# Patient Record
Sex: MALE | URBAN - METROPOLITAN AREA
[De-identification: names, ages, dates, MRNs, and addresses within clinical notes are randomized per-mention and may not be internally consistent; named-entity substitution may affect disease eponyms.]

---

## 2020-06-09 ENCOUNTER — APPOINTMENT (RX ONLY)
Dept: URBAN - METROPOLITAN AREA CLINIC 28 | Facility: CLINIC | Age: 30
Setting detail: DERMATOLOGY
End: 2020-06-09

## 2020-06-09 DIAGNOSIS — D22 MELANOCYTIC NEVI: ICD-10-CM

## 2020-06-09 PROBLEM — D22.5 MELANOCYTIC NEVI OF TRUNK: Status: ACTIVE | Noted: 2020-06-09

## 2020-06-09 PROCEDURE — ? COUNSELING

## 2020-06-09 PROCEDURE — ? PUNCH EXCISION

## 2020-06-09 PROCEDURE — 11400 EXC TR-EXT B9+MARG 0.5 CM<: CPT

## 2020-06-09 ASSESSMENT — LOCATION DETAILED DESCRIPTION DERM: LOCATION DETAILED: RIGHT LATERAL ABDOMEN

## 2020-06-09 ASSESSMENT — LOCATION ZONE DERM: LOCATION ZONE: TRUNK

## 2020-06-09 ASSESSMENT — LOCATION SIMPLE DESCRIPTION DERM: LOCATION SIMPLE: ABDOMEN

## 2020-06-09 NOTE — PROCEDURE: PUNCH EXCISION

## 2020-06-23 ENCOUNTER — APPOINTMENT (RX ONLY)
Dept: URBAN - METROPOLITAN AREA CLINIC 28 | Facility: CLINIC | Age: 30
Setting detail: DERMATOLOGY
End: 2020-06-23

## 2020-06-23 DIAGNOSIS — Z48.02 ENCOUNTER FOR REMOVAL OF SUTURES: ICD-10-CM

## 2020-06-23 PROCEDURE — ? SUTURE REMOVAL (GLOBAL PERIOD)

## 2020-06-23 PROCEDURE — 99024 POSTOP FOLLOW-UP VISIT: CPT

## 2020-06-23 ASSESSMENT — LOCATION ZONE DERM: LOCATION ZONE: TRUNK

## 2020-06-23 ASSESSMENT — LOCATION SIMPLE DESCRIPTION DERM: LOCATION SIMPLE: RIGHT LOWER BACK

## 2020-06-23 ASSESSMENT — LOCATION DETAILED DESCRIPTION DERM: LOCATION DETAILED: RIGHT SUPERIOR LATERAL MIDBACK

## 2020-06-23 NOTE — PROCEDURE: SUTURE REMOVAL (GLOBAL PERIOD)
Detail Level: Detailed
Add 09329 Cpt? (Important Note: In 2017 The Use Of 41863 Is Being Tracked By Cms To Determine Future Global Period Reimbursement For Global Periods): yes

## 2020-12-22 ENCOUNTER — APPOINTMENT (RX ONLY)
Dept: URBAN - METROPOLITAN AREA CLINIC 28 | Facility: CLINIC | Age: 30
Setting detail: DERMATOLOGY
End: 2020-12-22

## 2020-12-22 DIAGNOSIS — D22 MELANOCYTIC NEVI: ICD-10-CM

## 2020-12-22 DIAGNOSIS — L21.8 OTHER SEBORRHEIC DERMATITIS: ICD-10-CM

## 2020-12-22 DIAGNOSIS — Z87.2 PERSONAL HISTORY OF DISEASES OF THE SKIN AND SUBCUTANEOUS TISSUE: ICD-10-CM

## 2020-12-22 PROBLEM — D22.62 MELANOCYTIC NEVI OF LEFT UPPER LIMB, INCLUDING SHOULDER: Status: ACTIVE | Noted: 2020-12-22

## 2020-12-22 PROBLEM — D22.72 MELANOCYTIC NEVI OF LEFT LOWER LIMB, INCLUDING HIP: Status: ACTIVE | Noted: 2020-12-22

## 2020-12-22 PROBLEM — D22.5 MELANOCYTIC NEVI OF TRUNK: Status: ACTIVE | Noted: 2020-12-22

## 2020-12-22 PROCEDURE — ? PRESCRIPTION MEDICATION MANAGEMENT

## 2020-12-22 PROCEDURE — ? COUNSELING

## 2020-12-22 PROCEDURE — ? PRESCRIPTION

## 2020-12-22 PROCEDURE — 99213 OFFICE O/P EST LOW 20 MIN: CPT

## 2020-12-22 PROCEDURE — ? FULL BODY SKIN EXAM

## 2020-12-22 RX ORDER — BETAMETHASONE DIPROPIONATE 0.5 MG/ML
LOTION TOPICAL QHS
Qty: 1 | Refills: 3 | Status: ACTIVE

## 2020-12-22 ASSESSMENT — LOCATION DETAILED DESCRIPTION DERM
LOCATION DETAILED: INFERIOR THORACIC SPINE
LOCATION DETAILED: LEFT DISTAL POSTERIOR UPPER ARM
LOCATION DETAILED: XIPHOID
LOCATION DETAILED: LEFT DORSAL FOOT
LOCATION DETAILED: POSTERIOR MID-PARIETAL SCALP
LOCATION DETAILED: PERIUMBILICAL SKIN

## 2020-12-22 ASSESSMENT — LOCATION SIMPLE DESCRIPTION DERM
LOCATION SIMPLE: LEFT UPPER ARM
LOCATION SIMPLE: LEFT FOOT
LOCATION SIMPLE: ABDOMEN
LOCATION SIMPLE: POSTERIOR SCALP
LOCATION SIMPLE: UPPER BACK

## 2020-12-22 ASSESSMENT — LOCATION ZONE DERM
LOCATION ZONE: FEET
LOCATION ZONE: TRUNK
LOCATION ZONE: SCALP
LOCATION ZONE: ARM

## 2020-12-22 NOTE — PROCEDURE: PRESCRIPTION MEDICATION MANAGEMENT
Initiate Treatment: betamethasone dipropionate 0.05 % lotion QHS: Apply a few scattered drops into AA of scalp QHS
Render In Strict Bullet Format?: No
Detail Level: Zone
Continue Regimen: Ketoconzole 2% shampoo: shampoo scalp each wash

## 2022-10-18 ENCOUNTER — OFFICE VISIT (OUTPATIENT)
Dept: PRIMARY CARE | Facility: CLINIC | Age: 32
End: 2022-10-18
Payer: COMMERCIAL

## 2022-10-18 VITALS
WEIGHT: 155 LBS | HEART RATE: 75 BPM | DIASTOLIC BLOOD PRESSURE: 64 MMHG | TEMPERATURE: 97.7 F | BODY MASS INDEX: 22.96 KG/M2 | SYSTOLIC BLOOD PRESSURE: 120 MMHG | OXYGEN SATURATION: 99 % | HEIGHT: 69 IN

## 2022-10-18 DIAGNOSIS — F89 NEURODEVELOPMENTAL DISORDER: ICD-10-CM

## 2022-10-18 DIAGNOSIS — F90.0 ADHD, PREDOMINANTLY INATTENTIVE TYPE: ICD-10-CM

## 2022-10-18 DIAGNOSIS — F41.1 GENERALIZED ANXIETY DISORDER: ICD-10-CM

## 2022-10-18 DIAGNOSIS — Z00.00 ENCOUNTER FOR MEDICAL EXAMINATION TO ESTABLISH CARE: Primary | ICD-10-CM

## 2022-10-18 PROCEDURE — 99214 OFFICE O/P EST MOD 30 MIN: CPT | Performed by: NURSE PRACTITIONER

## 2022-10-18 PROCEDURE — 3008F BODY MASS INDEX DOCD: CPT | Performed by: NURSE PRACTITIONER

## 2022-10-18 RX ORDER — HYDROXYZINE PAMOATE 25 MG/1
25 CAPSULE ORAL 3 TIMES DAILY PRN
Qty: 30 CAPSULE | Refills: 0 | Status: SHIPPED | OUTPATIENT
Start: 2022-10-18 | End: 2022-10-28

## 2022-10-18 ASSESSMENT — ENCOUNTER SYMPTOMS
ENDOCRINE NEGATIVE: 1
FEVER: 0
HEMATOLOGIC/LYMPHATIC NEGATIVE: 1
DIZZINESS: 0
FATIGUE: 0
HEADACHES: 0
EYES NEGATIVE: 1
BLOOD IN STOOL: 0
WHEEZING: 0
UNEXPECTED WEIGHT CHANGE: 0
ACTIVITY CHANGE: 1
SLEEP DISTURBANCE: 1
ALLERGIC/IMMUNOLOGIC NEGATIVE: 1
CHOKING: 0
DYSURIA: 0
CHILLS: 0
ABDOMINAL PAIN: 0
SHORTNESS OF BREATH: 0
PALPITATIONS: 0
HEMATURIA: 0
NERVOUS/ANXIOUS: 1
MUSCULOSKELETAL NEGATIVE: 1

## 2022-10-18 ASSESSMENT — PATIENT HEALTH QUESTIONNAIRE - PHQ9
SUM OF ALL RESPONSES TO PHQ9 QUESTIONS 1 & 2: 2
SUM OF ALL RESPONSES TO PHQ QUESTIONS 1-9: 12

## 2022-10-18 NOTE — ASSESSMENT & PLAN NOTE
Mother brought a copy of his  2016 neuro psych eval and follow up report after Dr. Neftaly Vivar.  Review of Dr. Vivar's notes shows that patient may benefit from assistance of office of vocational rehabilitation.  Patient did receive this help but now anxiety is too overwhelming and he is unable to function at work.  In reviewing notes from Dr. Vivar from September 9, 2016 it was noted that patient had learning disabilities and psychological problems that were severe enough to have him be considered disabled.

## 2022-10-18 NOTE — ASSESSMENT & PLAN NOTE
Patient here to establish care today.  Former patient of [facility] at [facility] [place] [facility].

## 2022-10-18 NOTE — PATIENT INSTRUCTIONS
Please arrange counseling and psychiatric evaluation:     [facility] [phone]     [facility] [phone]      [facility] [phone]

## 2022-10-18 NOTE — ASSESSMENT & PLAN NOTE
Patient has had vocational rehabilitation services but is struggling with working as [employer] at [employer].  He requested a job with less stress and responsibilities at [employer] but it seems like they blocked him from that position.  Will write for patient to be out of work for the next 2 weeks to try and establish him with psychiatric services.  Advised patient to try and find placement with a job with less stress.  Patient may need to take FMLA.  Mother to send forms if this is needed.

## 2022-10-18 NOTE — ASSESSMENT & PLAN NOTE
C/o tingling throughout whole body. Can come on at any moment. Also pain pressing up against ribcage.     #1: Feeling nervous, anxious, or on edge   Several days+1   #2: Not being able to stop or control worrying   More than half the days+2   #3: Worrying too much about different things   More than half the days+2   #4: Trouble relaxing   More than half the days+2   #5: Being so restless that it's hard to sit still   Several days+1   #6: Becoming easily annoyed or irritable    More than half the days+2   #7: Feeling afraid as if something awful might happen   More than half the days+2       0-4 minimal  5-9 mild  10-14 moderate  15-21 severe    Patient DORA score is 12 and PHQ score is also 12.  I offered to start patient on SSRI but patient's mother would prefer this to be done by psychiatry.  Referred patient to [facility] to arrange counseling and psychiatric services.  I explained that this may be delayed a few weeks and offered to write for as needed medication for panic attacks.  Mother willing to take this medication to prevent return to ER with panic attack.

## 2022-10-18 NOTE — PROGRESS NOTES
Subjective      Patient ID: Ender Arceo is a 32 y.o. male.  1990        NEW PATIENT  [facility] [place]    HISTORY OF PRESENT ILLNESS    Patient is an 32 y.o. male who presents on 10/18/2022 as new patient to establish care.  Previous PCP in [place] Dr. Wang of [facility].   Last visit   With Dr. Caraballo a few years ago before the pandemic.   Patient accompanied by his mother this morning.  Patient with history of unspecified developmental disorder characterized by executive functioning, memory, processing speed, and attention problems.  For this reason patient has had specialized job training through [facility] [employer] . No supports available now through this program and patient is struggling with anxiety  Patient first at [facility] in [place] on Saturday , 10/15   Diet-patient states his appetite is good.  Exercise-patient is active.  Sleep-patient states he does occasionally have problems sleeping.  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 + work place harrassment from co-workers and management. + PTSD. Back with old boss now. Was improving but now getting worse. Has not received psychotherapy or anxiolytic medications.      DTaP, Tdap, and Td Vaccines(7 - Td or Tdap) due on 06/17/2018  HIB Vaccines Completed  IPV Vaccines Completed  Will obtain other vaccination records from [facility].    Patient with past medical history of generalized anxiety disorder  Unspecified developmental disorder with executive functioning, memory, processing speed and attention problems.  ADHD, primarily inattentive type.    Was recommended in 2016 that patient receives psychotherapy and medications but this has not occurred.  Mother states that they were afraid to start him on medication because his brother had a bad reaction to medication.  Neuropsych eval states that patient has a combination of learning disabilities  "and psychological problems which are severe enough to make it impossible for him to obtain keep competitive employment on his own.  For this reason they felt he should be considered disabled.    MEDICATIONS  No current outpatient medications on file.    ALLERGIES  Patient has no known allergies.              The following have been reviewed and updated as appropriate in this visit:   Allergies  Meds  Problems       Review of Systems   Constitutional: Positive for activity change. Negative for chills, fatigue, fever and unexpected weight change.   HENT: Negative.    Eyes: Negative.    Respiratory: Negative for choking, shortness of breath and wheezing.    Cardiovascular: Negative for chest pain and palpitations.   Gastrointestinal: Negative for abdominal pain and blood in stool.   Endocrine: Negative.    Genitourinary: Negative for dysuria and hematuria.   Musculoskeletal: Negative.    Allergic/Immunologic: Negative.    Neurological: Negative for dizziness and headaches.   Hematological: Negative.    Psychiatric/Behavioral: Positive for behavioral problems and sleep disturbance. Negative for suicidal ideas. The patient is nervous/anxious.        Objective     Vitals:    10/18/22 0848   BP: 120/64   BP Location: Right upper arm   Patient Position: Sitting   Pulse: 75   Temp: 36.5 °C (97.7 °F)   TempSrc: Oral   SpO2: 99%   Weight: 70.3 kg (155 lb)   Height: 1.753 m (5' 9\")     Body mass index is 22.89 kg/m².    Physical Exam  Vitals reviewed.   Constitutional:       Appearance: Normal appearance.   HENT:      Head: Normocephalic and atraumatic.   Eyes:      Conjunctiva/sclera: Conjunctivae normal.   Cardiovascular:      Rate and Rhythm: Normal rate and regular rhythm.      Pulses: Normal pulses.      Heart sounds: Normal heart sounds.   Pulmonary:      Effort: Pulmonary effort is normal.      Breath sounds: Normal breath sounds.   Abdominal:      General: Bowel sounds are normal.      Palpations: Abdomen is soft. "   Skin:     General: Skin is warm and dry.      Capillary Refill: Capillary refill takes less than 2 seconds.   Neurological:      Mental Status: He is alert and oriented to person, place, and time.   Psychiatric:         Attention and Perception: Attention normal.         Mood and Affect: Mood is anxious and depressed.         Speech: Speech is delayed.         Behavior: Behavior is cooperative.         Thought Content: Thought content is not paranoid or delusional. Thought content does not include suicidal ideation. Thought content does not include homicidal or suicidal plan.         Cognition and Memory: Cognition is impaired.         Judgment: Judgment normal.         Assessment/Plan   Diagnoses and all orders for this visit:    Encounter for medical examination to establish care (Primary)  Assessment & Plan:  Patient here to establish care today.  Former patient of [facility].      Neurodevelopmental disorder  Assessment & Plan:  Mother brought a copy of his  2016 neuro psych eval and follow up report after Dr. Neftaly Vivar.  Review of Dr. Vivar's notes shows that patient may benefit from assistance of office of vocational rehabilitation.  Patient did receive this help but now anxiety is too overwhelming and he is unable to function at work.  In reviewing notes from Dr. Vivar from September 9, 2016 it was noted that patient had learning disabilities and psychological problems that were severe enough to have him be considered disabled.          Generalized anxiety disorder  Assessment & Plan:  C/o tingling throughout whole body. Can come on at any moment. Also pain pressing up against ribcage.     #1: Feeling nervous, anxious, or on edge   Several days+1   #2: Not being able to stop or control worrying   More than half the days+2   #3: Worrying too much about different things   More than half the days+2   #4: Trouble relaxing   More than half the days+2   #5: Being so restless that  it's hard to sit still   Several days+1   #6: Becoming easily annoyed or irritable    More than half the days+2   #7: Feeling afraid as if something awful might happen   More than half the days+2       0-4 minimal  5-9 mild  10-14 moderate  15-21 severe    Patient DORA score is 12 and PHQ score is also 12.  I offered to start patient on SSRI but patient's mother would prefer this to be done by psychiatry.  Referred patient to [facility] to arrange counseling and psychiatric services.  I explained that this may be delayed a few weeks and offered to write for as needed medication for panic attacks.  Mother willing to take this medication to prevent return to ER with panic attack.        ADHD, predominantly inattentive type  Assessment & Plan:  Patient has had vocational rehabilitation services but is struggling with working as [employer] at [employer].  He requested a job with less stress and responsibilities at [employer] but it seems like they blocked him from that position.  Will write for patient to be out of work for the next 2 weeks to try and establish him with psychiatric services.  Advised patient to try and find placement with a job with less stress.  Patient may need to take FMLA.  Mother to send forms if this is needed.      Other orders  -     hydrOXYzine (VistariL) 25 mg capsule; Take 1 capsule (25 mg total) by mouth 3 (three) times a day as needed for anxiety for up to 10 days.

## 2022-10-18 NOTE — LETTER
October 18, 2022     Patient: Ender Arceo  YOB: 1990  Date of Visit: 10/18/2022    To Whom it May Concern:    Ender Arceo was seen in my clinic on 10/18/2022 at 9:00 am. Please excuse Ender for his absence from work on this day to make the appointment.    Please excuse patient from work Thursday, 10/13/2022 through Sunday October 30, 2022. May return to work on Monday 10/31/22.     If you have any questions or concerns, please don't hesitate to call.         Sincerely,         [place] Klein        CC: No Recipients

## 2022-10-20 ENCOUNTER — TELEPHONE (OUTPATIENT)
Dept: PRIMARY CARE | Facility: CLINIC | Age: 32
End: 2022-10-20
Payer: COMMERCIAL

## 2022-10-21 ENCOUNTER — TELEPHONE (OUTPATIENT)
Dept: PRIMARY CARE | Facility: CLINIC | Age: 32
End: 2022-10-21
Payer: COMMERCIAL

## 2022-10-21 PROBLEM — F32.A MODERATELY SEVERE DEPRESSION: Status: ACTIVE | Noted: 2022-10-21

## 2022-10-21 NOTE — TELEPHONE ENCOUNTER
Patient called asking about his [facility] paper work? I let him know we received paper work from [facility]. He asked if anything was sent over from [facility] to be completed and we do not have any paper work from [facility].    Patient is asking about an extension for returning to work? He stated there is a two week wait for him to see the therapist.

## 2022-10-21 NOTE — TELEPHONE ENCOUNTER
Patients mother left a message asking for our fax number so FMLA paper work could be sent to the office.    I left a message on patients cell regarding the FMLA paper work and also his mother is not listed on the PHI for him so I am unable to discuss this with her. We did receive the FMLA forms and they are in Denisse's folder.

## 2022-10-24 NOTE — TELEPHONE ENCOUNTER
Left message on cell phone that disability paper work was completed and faxed out on Friday 10/21. Also, left on message for patient to schedule an appointment with [facility] for this Friday.

## 2022-10-25 ENCOUNTER — DOCUMENTATION (OUTPATIENT)
Dept: PRIMARY CARE | Facility: CLINIC | Age: 32
End: 2022-10-25

## 2022-10-25 ENCOUNTER — TELEMEDICINE (OUTPATIENT)
Dept: PRIMARY CARE | Facility: CLINIC | Age: 32
End: 2022-10-25
Payer: COMMERCIAL

## 2022-10-25 ENCOUNTER — TELEPHONE (OUTPATIENT)
Dept: PRIMARY CARE | Facility: CLINIC | Age: 32
End: 2022-10-25

## 2022-10-25 DIAGNOSIS — F41.1 GENERALIZED ANXIETY DISORDER: Primary | ICD-10-CM

## 2022-10-25 DIAGNOSIS — F89 NEURODEVELOPMENTAL DISORDER: ICD-10-CM

## 2022-10-25 PROCEDURE — 99213 OFFICE O/P EST LOW 20 MIN: CPT | Mod: 95 | Performed by: NURSE PRACTITIONER

## 2022-10-25 ASSESSMENT — ENCOUNTER SYMPTOMS
DIZZINESS: 0
COUGH: 0
SHORTNESS OF BREATH: 0
PALPITATIONS: 0
FEVER: 0
DIFFICULTY URINATING: 0
CHILLS: 0
ABDOMINAL PAIN: 0
HEADACHES: 0
DIAPHORESIS: 0
WHEEZING: 0
NERVOUS/ANXIOUS: 1

## 2022-10-25 NOTE — PROGRESS NOTES
Verification of Patient Location:  The patient affirms they are currently located in the following state: [place]    Request for Consent:    Audio and Video Encounter   Hello, my name is KELSY Klein.  Before we proceed, can you please verify your identification by telling me your full name and date of birth?  Can you tell me who is in the room with you?    You and I are about to have a telemedicine check-in or visit because you have requested it.  This is a live video-conference.  I am a real person, speaking to you in real time.  There is no one else with me on the video-conference.  However, when we use (LVL6, ShoutOmatic, etc) it is important for you to know that the video-conference may not be secure or private.  I am not recording this conversation and I am asking you not to record it.  This telemedicine visit will be billed to your health insurance or you, if you are self-insured.  You understand you will be responsible for any copayments or coinsurances that apply to your telemedicine visit.  Communication platform used for this encounter:  [employer] Video Visit (Epic Video Client)     Before starting our telemedicine visit, I am required to get your consent for this virtual check-in or visit by telemedicine. Do you consent?      Patient Response to Request for Consent:  Yes      Visit Documentation:  Subjective    Last visit with patient on 10/18/2022.  At that time,  Patient DORA score was 12 and PHQ score also 12.  I offered to start patient on SSRI but patient's mother would prefer this to be done by psychiatry.  Referred patient to [facility] to arrange counseling and psychiatric services.  I explained that this may be delayed a few weeks and offered to write for as needed medication for panic attacks.  Mother willing to take this medication to prevent return to ER with panic attack.    Telehealth visit today to check on patient status.  Visit conducted with patient and his  mother.  Patient did have a Panic attach Saturday, 10/22.   C/o Tingling feeling everywhere and started shaking.   Patient did take hydroxyzine that I prescribed.  Patient states he remained anxious for 2 hours and then fell asleep.  Working on setting up counseling.    Had an ntake appt last week with [facility] [employer] [facility] and that didn't work out so now trying [facility].  Patient's mother states that [employer] did not work out because [employer] is unwilling to complete any paperwork for patient to be out of work.              HPI    The following have been reviewed and updated as appropriate in this visit:   Allergies  Meds  Problems  Surg Hx       Review of Systems   Constitutional: Negative for chills, diaphoresis and fever.   Respiratory: Negative for cough, shortness of breath and wheezing.    Cardiovascular: Negative for chest pain and palpitations.   Gastrointestinal: Negative for abdominal pain.   Genitourinary: Negative for difficulty urinating.   Neurological: Negative for dizziness and headaches.        Patient complains of tremors and tingling when he is having a panic attack   Psychiatric/Behavioral: Positive for behavioral problems. Negative for self-injury and suicidal ideas. The patient is nervous/anxious.         Mother present for visit          Assessment/Plan   Diagnoses and all orders for this visit:    Generalized anxiety disorder (Primary)    Neurodevelopmental disorder       Generalized anxiety disorder     Generalized anxiety disorder follow up:   [employer] works intake appt for Friday for counseling services.  Appt with psychiatrist on Monday, 10/31.   Patient was scheduled to return to work on Monday 1031 but considering difficulty setting up services will extend leave through December 4 with possible return to work scheduled for December 5.  Patient's mother states that in addition to the disability paperwork I completed last week there another form that needs  to be completed for [facility].  Mrs. Arceo states that she will fax that to my office from [employer] today and it is due by the end of the week.        Neurodevelopmental disorder  Assessment & Plan:  Mother brought a copy of his  2016 neuro psych eval and follow up report after Dr. Neftaly Vivar.  Review of Dr. Vivar's notes shows that patient may benefit from assistance of office of vocational rehabilitation.  Patient did receive this help but now anxiety is too overwhelming and he is unable to function at work.  In reviewing notes from Dr. Vivar from September 9, 2016 it was noted that patient had learning disabilities and psychological problems that were severe enough to have him be considered disabled.       Time Spent:  I spent 20 minutes on this date of service performing the following activities: documenting, providing counseling and education and coordinating care.

## 2022-10-25 NOTE — TELEPHONE ENCOUNTER
I did a televisit with the patient this morning.  His mother states that she will fax the form for [employer] to me this afternoon from [employer].  Please forward to me when it comes in so I can complete it and return it.  .

## 2022-10-25 NOTE — TELEPHONE ENCOUNTER
Patient left a message asking about [facility] paper work and extended leave for work?    I returned his call. I left a message on patients cell that we received the [facility] paper work last Thursday 10/20 and Denisse completed and faxed it back on 10/21 at 4:36pm. We have not received any additional paper work from [facility]. If the date of leave needs to be extended or changed this can be updated on the [facility] paper work.

## 2022-10-25 NOTE — PROGRESS NOTES
Panic attach Saturday.   Tingling feeling everywhere and started shaking.   Working on setting up counseling.   Intake appt last week with [facility] and that didn't work out so now trying   [facility]. Intake appt for Friday.   Appt with psychiatrist on Monday

## 2022-10-31 ENCOUNTER — TELEPHONE (OUTPATIENT)
Dept: PRIMARY CARE | Facility: CLINIC | Age: 32
End: 2022-10-31
Payer: COMMERCIAL

## 2022-10-31 NOTE — TELEPHONE ENCOUNTER
Andrew Quarles MD  [address]  [place] [address] [phone]  OR  [address], [address]  [place] [address]

## 2022-10-31 NOTE — TELEPHONE ENCOUNTER
Patient's mother called and left message. Patient was seen by a doctor today and was recommended to see a neurologist for next step. Patient's mother is requesting a recommendation from [place] for a neurologist close to where they live in [place].

## 2022-11-15 ENCOUNTER — TELEPHONE (OUTPATIENT)
Dept: PRIMARY CARE | Facility: CLINIC | Age: 32
End: 2022-11-15
Payer: COMMERCIAL

## 2022-11-15 NOTE — TELEPHONE ENCOUNTER
Katie (mother) left a message that patient can not navigate the patient portal to leave a message.    He is having a difficult time getting an appointment with a Neurologist? They are booked until the end of January.   She is also asking about an MRI to rule out MS per the psychiatrist?    She is asking if Denisse can order the MRI?     Any questions Katie can be reached at [phone].

## 2022-11-16 NOTE — TELEPHONE ENCOUNTER
Spoke with patient's mother. Explained that patient needs to see neuro for an neuropsych eval and at that time, neurologist can order MRI if needed. Patient's mother acknowledged understanding.

## 2022-12-02 ENCOUNTER — TELEMEDICINE (OUTPATIENT)
Dept: PRIMARY CARE | Facility: CLINIC | Age: 32
End: 2022-12-02
Payer: COMMERCIAL

## 2022-12-02 DIAGNOSIS — F41.1 GENERALIZED ANXIETY DISORDER: Primary | ICD-10-CM

## 2022-12-02 DIAGNOSIS — F90.0 ADHD, PREDOMINANTLY INATTENTIVE TYPE: ICD-10-CM

## 2022-12-02 DIAGNOSIS — F32.A MODERATELY SEVERE DEPRESSION: ICD-10-CM

## 2022-12-02 DIAGNOSIS — F89 NEURODEVELOPMENTAL DISORDER: ICD-10-CM

## 2022-12-02 PROBLEM — Z00.00 ENCOUNTER FOR MEDICAL EXAMINATION TO ESTABLISH CARE: Status: RESOLVED | Noted: 2022-10-18 | Resolved: 2022-12-02

## 2022-12-02 PROCEDURE — 99213 OFFICE O/P EST LOW 20 MIN: CPT | Mod: 95 | Performed by: NURSE PRACTITIONER

## 2022-12-02 ASSESSMENT — ENCOUNTER SYMPTOMS
DIFFICULTY URINATING: 0
ABDOMINAL PAIN: 0
EYES NEGATIVE: 1
DIAPHORESIS: 0
WHEEZING: 0
NUMBNESS: 1
LIGHT-HEADEDNESS: 0
MUSCULOSKELETAL NEGATIVE: 1
COUGH: 0
ENDOCRINE NEGATIVE: 1
PALPITATIONS: 0
HEADACHES: 0
DECREASED CONCENTRATION: 1
CHILLS: 0
SHORTNESS OF BREATH: 0
DIZZINESS: 0
FEVER: 0
NERVOUS/ANXIOUS: 1
SLEEP DISTURBANCE: 1

## 2022-12-02 NOTE — ASSESSMENT & PLAN NOTE
Patient with history of unspecified developmental disorder characterized by executive functioning, memory, processing speed, and attention problems.  For this reason patient has had specialized job training through [employer] . No supports available now through this program and patient is struggling with anxiety

## 2022-12-02 NOTE — PROGRESS NOTES
Verification of Patient Location:  The patient affirms they are currently located in the following state: [place]    Request for Consent:    Audio and Video Encounter   Hello, my name is KELSY Klein.  Before we proceed, can you please verify your identification by telling me your full name and date of birth?  Can you tell me who is in the room with you?    You and I are about to have a telemedicine check-in or visit because you have requested it.  This is a live video-conference.  I am a real person, speaking to you in real time.  There is no one else with me on the video-conference. I am not recording this conversation and I am asking you not to record it.  This telemedicine visit will be billed to your health insurance or you, if you are self-insured.  You understand you will be responsible for any copayments or coinsurances that apply to your telemedicine visit.  Communication platform used for this encounter:  [employer] Video Visit (Epic Video Client)       Before starting our telemedicine visit, I am required to get your consent for this virtual check-in or visit by telemedicine. Do you consent?      Patient Response to Request for Consent:  Yes      Visit Documentation:  Subjective     Patient ID: Ender Arceo is a 32 y.o. male.  1990             Patient is an 32 y.o. male who presents on 12/2/2022 to follow up   Last office visit with patient 10/25/2022.   First visit with patient on 10/18/2022.  At that time,  Patient DORA score was 12 and PHQ score also 12.  I offered to start patient on SSRI but patient's mother would prefer this to be done by psychiatry.  Referred patient to [facility] to arrange counseling and psychiatric services.  I explained that this may be delayed a few weeks and offered to write for as needed medication for panic attacks.  Mother willing to take this medication to prevent return to ER with panic attack.  [employer] works intake  Completed. Having  counseling once a week.   Appt with psychiatrist on Monday, 10/31. Saw Dr. Radha Farrell. No medication started.   Panic attacks are less frequent (c/o tingling) .Stopped taking hydroxyzine.   Patient states he is sleeping 5-6 hours per night.   Appetite is good.   Psychologist wants patient to have neurology eval due to mom having MS and him having symptoms of N/T.   Neurology eval scheduled for January. On cancellation list.   Patient states he does not feel well enough to return to work.   Needs extension of FMLA and fax office notes to [facility]. (fax [phone] for claim # [id number])       Patient Care Team:  Denisse Coreas CRNP as PCP - General (Internal Medicine)        PAST MEDICAL AND SURGICAL HISTORY  Patient with history of unspecified developmental disorder characterized by executive functioning, memory, processing speed, and attention problems.  For this reason patient has had specialized job training through [employer] . No supports available now through this program and patient is struggling with anxiety.     ALLERGIES  Patient has no known allergies.          The following have been reviewed and updated as appropriate in this visit:        Review of Systems   Constitutional: Negative for chills, diaphoresis and fever.   HENT: Negative.    Eyes: Negative.    Respiratory: Negative for cough, shortness of breath and wheezing.    Cardiovascular: Negative for chest pain and palpitations.   Gastrointestinal: Negative for abdominal pain.   Endocrine: Negative.    Genitourinary: Negative for difficulty urinating.   Musculoskeletal: Negative.    Skin: Negative.    Neurological: Positive for numbness. Negative for dizziness, light-headedness and headaches.        C/o sporadic N/T before panic attacks.    Psychiatric/Behavioral: Positive for decreased concentration and sleep disturbance. Negative for self-injury and suicidal ideas. The patient is nervous/anxious.          Assessment/Plan   Diagnoses  and all orders for this visit:    Generalized anxiety disorder (Primary)  Assessment & Plan:  Patient followed by psychiatrist and receiving counseling weekly. Will extend leave until 1/6/2023. Family to fax FMLA form for completion and we will fax today's notes to [facility] as requested.       Moderately severe depression  Assessment & Plan:  Patient states depression has been less of an issue than anxiety.       Neurodevelopmental disorder  Assessment & Plan:  Patient with history of unspecified developmental disorder characterized by executive functioning, memory, processing speed, and attention problems.  For this reason patient has had specialized job training through [facility] . No supports available now through this program and patient is struggling with anxiety      ADHD, predominantly inattentive type  Assessment & Plan:  Patient not receiving medication for this diagnosis.         Time Spent:  I spent 20 minutes on this date of service performing the following activities: obtaining history, reviewing records, on televisit, discussing plan of care and completing paperwork.

## 2022-12-02 NOTE — ASSESSMENT & PLAN NOTE
Patient followed by psychiatrist and receiving counseling weekly. Will extend leave until 1/6/2023. Family to fax FMLA form for completion and we will fax today's notes to [employer] as requested.

## 2022-12-08 ENCOUNTER — TELEPHONE (OUTPATIENT)
Dept: PRIMARY CARE | Facility: CLINIC | Age: 32
End: 2022-12-08
Payer: COMMERCIAL

## 2022-12-08 NOTE — TELEPHONE ENCOUNTER
Spoke with patients mother Katie Arceo this am, regarding medical record note from last visit. Which was faxed this am to [employer].

## 2022-12-12 ENCOUNTER — TELEPHONE (OUTPATIENT)
Dept: PRIMARY CARE | Facility: CLINIC | Age: 32
End: 2022-12-12
Payer: COMMERCIAL

## 2022-12-12 NOTE — TELEPHONE ENCOUNTER
"\"[employer]\" (short term disability company) to know patient's  restrictions and limitations as to why he cannot due his job right now  Claim # [id number]  Fax # [phone]  "

## 2022-12-22 ENCOUNTER — OFFICE VISIT (OUTPATIENT)
Dept: PRIMARY CARE | Facility: CLINIC | Age: 32
End: 2022-12-22
Payer: COMMERCIAL

## 2022-12-22 VITALS
HEART RATE: 73 BPM | DIASTOLIC BLOOD PRESSURE: 82 MMHG | BODY MASS INDEX: 23.4 KG/M2 | RESPIRATION RATE: 16 BRPM | WEIGHT: 158 LBS | HEIGHT: 69 IN | OXYGEN SATURATION: 99 % | TEMPERATURE: 97.5 F | SYSTOLIC BLOOD PRESSURE: 132 MMHG

## 2022-12-22 DIAGNOSIS — F32.A MILD DEPRESSIVE DISORDER: ICD-10-CM

## 2022-12-22 DIAGNOSIS — R52 CHRONIC PAIN OF MULTIPLE SITES: ICD-10-CM

## 2022-12-22 DIAGNOSIS — F41.1 GENERALIZED ANXIETY DISORDER: Primary | ICD-10-CM

## 2022-12-22 DIAGNOSIS — G89.29 CHRONIC PAIN OF MULTIPLE SITES: ICD-10-CM

## 2022-12-22 DIAGNOSIS — F90.0 ADHD, PREDOMINANTLY INATTENTIVE TYPE: ICD-10-CM

## 2022-12-22 PROCEDURE — 3008F BODY MASS INDEX DOCD: CPT | Performed by: NURSE PRACTITIONER

## 2022-12-22 PROCEDURE — 99214 OFFICE O/P EST MOD 30 MIN: CPT | Performed by: NURSE PRACTITIONER

## 2022-12-22 ASSESSMENT — ENCOUNTER SYMPTOMS
SHORTNESS OF BREATH: 0
SLEEP DISTURBANCE: 1
WHEEZING: 0
DYSURIA: 0
ABDOMINAL PAIN: 0
EYES NEGATIVE: 1
DIZZINESS: 1
NERVOUS/ANXIOUS: 1
MUSCULOSKELETAL NEGATIVE: 1
PALPITATIONS: 0
LIGHT-HEADEDNESS: 1
ENDOCRINE NEGATIVE: 1
NUMBNESS: 1
COUGH: 0
FATIGUE: 1
HEMATURIA: 0
BLOOD IN STOOL: 0

## 2022-12-22 ASSESSMENT — PATIENT HEALTH QUESTIONNAIRE - PHQ9
SUM OF ALL RESPONSES TO PHQ QUESTIONS 1-9: 6
SUM OF ALL RESPONSES TO PHQ9 QUESTIONS 1 & 2: 2

## 2022-12-22 NOTE — ASSESSMENT & PLAN NOTE
Patient continues to receive counseling weekly.  His psychiatrist left the practice and there is no other psychiatrist for him to see.  Patient anxiety appears to be worse today.  I have asked patient to consider resuming hydroxyzine at night so he can get some sleep.  I feel that his lack of sleep is exacerbated as he has anxiety.  Patient verbalized understanding.  Will extend leave through 2/2/2023.  Patient to then follow-up with me at that time to discuss work-up as decided by neurology

## 2022-12-22 NOTE — PATIENT INSTRUCTIONS
Will extend leave through 2/2/2023.  Patient to then follow-up with me at that time to discuss work-up as decided by neurology

## 2022-12-22 NOTE — ASSESSMENT & PLAN NOTE
"Patient complains of intermittent tingling in arms.  Also complains of \"pinching pain \"in random spots on torso and arms.  Feels this increases with his anxiety.  Mother with history of MS.  For this reason psychiatry has asked that patient follow-up with neurologist.  Patient does have an appointment to see a neurologist on January 27.  "

## 2022-12-22 NOTE — ASSESSMENT & PLAN NOTE
Patient with history of unspecified developmental disorder characterized by executive functioning, memory, processing speed and attention problems.  He would be unable to perform a job at this time without a one-on-one support present for him at work.

## 2022-12-22 NOTE — PROGRESS NOTES
"      Subjective      Patient ID: Ender Arceo is a 32 y.o. male.  1990        HISTORY OF PRESENT ILLNESS  Follow-up (anxiety)    Patient is an 32 y.o. male who presents on 12/22/2022 accompanied by his mother.   Patient c/o intermittent  tingling in arms and extremities   Panic attacks occur in \"spurts\" . Last one felt like it was coming on last night.   Counseling once a week. Psychiatrist left the company and no one else available now to fill in as psychiatrist.   Likes his current counselor.   Also c/o SOB and palpitations with anxiety attacks.   C/o visual migraines which are induced by stress; had this occur while working; needs breaks while working due to this.   Now getting 4 hours of sleep per night.   C/o dizziness in \"room full of people\"   Short tempered due to anxiety.   C/o intermittent \" Pinching  Pain\"  in lower abdomen , chest, arms   C/o Short term memory loss  Neurology appt is January 27th.   Felt that hydroxyzine increased depression. Felt like a \"zombie\" .   Patient is hesitant to start antidepressant because his brother had difficulty getting off of antidepressants.  Patient's brother currently uses medical marijuana for his symptoms of  anxiety and depression and tremors.  Patient has not tried this before      DTaP, Tdap, and Td Vaccines(7 - Td or Tdap) due on 06/17/2018  Depression Screening due on 10/18/2023  HIB Vaccines Completed  Hepatitis B Vaccines Completed  HPV Vaccines Completed    MEDICATIONS  No current outpatient medications on file.    ALLERGIES  Patient has no known allergies.    Social History    Tobacco Use      Smoking status: Never      Smokeless tobacco: Never    Vaping Use      Vaping Use: Never used    Alcohol use: Not Currently    Drug use: Never      /82 (BP Location: Right upper arm, Patient Position: Sitting)   Pulse 73   Temp 36.4 °C (97.5 °F)   Resp 16   Ht 1.753 m (5' 9\")   Wt 71.7 kg (158 lb)   SpO2 99%   BMI 23.33 kg/m²    Body mass index is " "23.33 kg/m².  Wt Readings from Last 3 Encounters:  12/22/22 : 71.7 kg (158 lb)  10/18/22 : 70.3 kg (155 lb)               The following have been reviewed and updated as appropriate in this visit:        Review of Systems   Constitutional: Positive for fatigue.   HENT: Negative.    Eyes: Negative.    Respiratory: Negative for cough, shortness of breath and wheezing.    Cardiovascular: Negative for chest pain and palpitations.   Gastrointestinal: Negative for abdominal pain and blood in stool.   Endocrine: Negative.    Genitourinary: Negative for dysuria and hematuria.   Musculoskeletal: Negative.    Skin: Negative.    Neurological: Positive for dizziness, light-headedness and numbness.        C/o  pinching pain in random spots all over her body.   Psychiatric/Behavioral: Positive for sleep disturbance. The patient is nervous/anxious.        Objective     Vitals:    12/22/22 1356   BP: 132/82   BP Location: Right upper arm   Patient Position: Sitting   Pulse: 73   Resp: 16   Temp: 36.4 °C (97.5 °F)   SpO2: 99%   Weight: 71.7 kg (158 lb)   Height: 1.753 m (5' 9\")     Body mass index is 23.33 kg/m².    Physical Exam  Vitals reviewed.   Constitutional:       Appearance: Normal appearance.      Comments: Appears very anxious   HENT:      Head: Normocephalic and atraumatic.   Eyes:      Conjunctiva/sclera: Conjunctivae normal.   Cardiovascular:      Rate and Rhythm: Normal rate and regular rhythm.      Pulses: Normal pulses.      Heart sounds: Normal heart sounds.   Pulmonary:      Effort: Pulmonary effort is normal.      Breath sounds: Normal breath sounds.   Abdominal:      General: Bowel sounds are normal.      Palpations: Abdomen is soft.      Tenderness: There is no guarding or rebound.   Musculoskeletal:         General: Normal range of motion.      Cervical back: Normal range of motion.      Right lower leg: No edema.      Left lower leg: No edema.   Skin:     General: Skin is warm and dry.      Capillary Refill: " "Capillary refill takes less than 2 seconds.   Neurological:      Mental Status: He is alert.      Cranial Nerves: Cranial nerves 2-12 are intact.      Motor: No tremor or abnormal muscle tone.      Comments: Able to follow commands   Psychiatric:         Attention and Perception: Attention normal.         Mood and Affect: Mood is anxious.         Speech: Speech is rapid and pressured.         Behavior: Behavior is cooperative.         Cognition and Memory: Cognition is impaired. Memory is impaired.      Comments: C/o feeling very stressed         Assessment/Plan   Diagnoses and all orders for this visit:    Generalized anxiety disorder (Primary)  Assessment & Plan:  Patient continues to receive counseling weekly.  His psychiatrist left the practice and there is no other psychiatrist for him to see.  Patient anxiety appears to be worse today.  I have asked patient to consider resuming hydroxyzine at night so he can get some sleep.  I feel that his lack of sleep is exacerbated as he has anxiety.  Patient verbalized understanding.  Will extend leave through 2/2/2023.  Patient to then follow-up with me at that time to discuss work-up as decided by neurology      Chronic pain of multiple sites  Assessment & Plan:  Patient complains of intermittent tingling in arms.  Also complains of \"pinching pain \"in random spots on torso and arms.  Feels this increases with his anxiety.  Mother with history of MS.  For this reason psychiatry has asked that patient follow-up with neurologist.  Patient does have an appointment to see a neurologist on January 27.      Mild depressive disorder  Assessment & Plan:  PHQ scale shows mild depression.  Patient feels that anxiety is definitely more confused than his depression.  I offered starting a low-dose medication for anxiety depression called escitalopram.  Patient would prefer to continue with therapy and have antidepressant prescribed by psychiatry.  Unable to get into see a " psychiatrist at this time.  Will ask case management if they can assist us.      ADHD, predominantly inattentive type  Assessment & Plan:  Unchanged.

## 2022-12-22 NOTE — ASSESSMENT & PLAN NOTE
PHQ scale shows mild depression.  Patient feels that anxiety is definitely more confused than his depression.  I offered starting a low-dose medication for anxiety depression called escitalopram.  Patient would prefer to continue with therapy and have antidepressant prescribed by psychiatry.  Unable to get into see a psychiatrist at this time.  Will ask case management if they can assist us.

## 2023-01-16 ENCOUNTER — TELEPHONE (OUTPATIENT)
Dept: PRIMARY CARE | Facility: CLINIC | Age: 33
End: 2023-01-16
Payer: COMMERCIAL

## 2023-01-16 NOTE — TELEPHONE ENCOUNTER
Patients mother smith called and asked if the fmla forms were filled out and faxed over.    The completed form was scanned in and faxed over again today 1/16/23.

## 2023-01-27 ENCOUNTER — OFFICE VISIT (OUTPATIENT)
Dept: NEUROLOGY | Facility: CLINIC | Age: 33
End: 2023-01-27
Payer: COMMERCIAL

## 2023-01-27 VITALS
HEART RATE: 73 BPM | OXYGEN SATURATION: 99 % | DIASTOLIC BLOOD PRESSURE: 88 MMHG | WEIGHT: 156 LBS | BODY MASS INDEX: 23.11 KG/M2 | SYSTOLIC BLOOD PRESSURE: 136 MMHG | HEIGHT: 69 IN

## 2023-01-27 DIAGNOSIS — G37.9 DEMYELINATING DISEASE (CMS/HCC): Primary | ICD-10-CM

## 2023-01-27 PROCEDURE — 3008F BODY MASS INDEX DOCD: CPT | Performed by: STUDENT IN AN ORGANIZED HEALTH CARE EDUCATION/TRAINING PROGRAM

## 2023-01-27 PROCEDURE — 99204 OFFICE O/P NEW MOD 45 MIN: CPT | Performed by: STUDENT IN AN ORGANIZED HEALTH CARE EDUCATION/TRAINING PROGRAM

## 2023-01-27 NOTE — PROGRESS NOTES
"Neurology Outpatient Encounter  [facility]    Patient Name: Ender Arceo   Patient : 1990  Patient MRN: [id number]  Date of service: 23     Summary:   CC: Constellation of symptoms    He has been experiencing a wide constellation of symptoms over the last several months.  Symptoms worsened in October in what mother describes as a \"anxious breakdown\" which caused him to stop working.  This has been associated with frequent panic attacks.  He has a long history of anxiety but this dramatically worsened several months ago.  In addition to the anxiety he has numerous somatic complaints.    His complaints include episodic nerve tingling, nerve pain, heart palpitations, sweating, headaches, dizziness, shortness of breath, short-term memory difficulties, panic attacks, bruxism, difficulty concentrating, depression, anxiety in specific situations.    He sees a therapist weekly.  No currently on medications for anxiety.     Denies any episodes of optic neuritis, DELMA, transverse myelitis, area postrema syndrome, cerebellar syndrome.      Mother has multiple sclerosis.     Assessment & Plan:  Patient presents for a variety of neurologic and nonneurologic symptoms.  I suspect the vast majority of his symptoms are somatic complaints secondary to uncontrolled and untreated anxiety.  I cannot completely rule out the possibility of an underlying superimposed organic neurologic pathology.  Given the demographics and family history will obtain MRI of the brain query multiple sclerosis.  I strongly encouraged him and his mother to seek appropriate treatment for the underlying anxiety.    Examination:   Vitals:    23 1041   BP: 136/88   Pulse: 73   SpO2: 99%     Mental status: Wide awake, alert.  Normal comprehension, attention.  Cranial nerves: Pupils equal, reactive.  No RAPD.  Full EOM.  No DELMA.  Face symmetric.  Speech clear.  Motor: No focal weakness.  Normal tone.  No abnormal " movements.  Sensation: Intact to LT throughout.  Reflexes: 2+ biceps, triceps, patellar, Achilles.  No Babinski.  Cerebellar: No dysmetria FNF.  No truncal ataxia.    Orders Placed This Encounter   Procedures   • MRI BRAIN WITH AND WITHOUT CONTRAST     History reviewed. No pertinent past medical history.     No current outpatient medications on file.     No current facility-administered medications for this visit.     Jonas Adorno MD  Neurology

## 2023-01-30 ENCOUNTER — TELEMEDICINE (OUTPATIENT)
Dept: PRIMARY CARE | Facility: CLINIC | Age: 33
End: 2023-01-30
Payer: COMMERCIAL

## 2023-01-30 DIAGNOSIS — F89 NEURODEVELOPMENTAL DISORDER: ICD-10-CM

## 2023-01-30 DIAGNOSIS — Z13.220 LIPID SCREENING: ICD-10-CM

## 2023-01-30 DIAGNOSIS — F90.0 ADHD, PREDOMINANTLY INATTENTIVE TYPE: ICD-10-CM

## 2023-01-30 DIAGNOSIS — G89.29 CHRONIC PAIN OF MULTIPLE SITES: ICD-10-CM

## 2023-01-30 DIAGNOSIS — Z13.1 DIABETES MELLITUS SCREENING: ICD-10-CM

## 2023-01-30 DIAGNOSIS — R52 CHRONIC PAIN OF MULTIPLE SITES: ICD-10-CM

## 2023-01-30 DIAGNOSIS — R53.83 FATIGUE, UNSPECIFIED TYPE: ICD-10-CM

## 2023-01-30 DIAGNOSIS — F32.A MODERATELY SEVERE DEPRESSION: ICD-10-CM

## 2023-01-30 DIAGNOSIS — F41.1 GENERALIZED ANXIETY DISORDER: Primary | ICD-10-CM

## 2023-01-30 PROCEDURE — 99214 OFFICE O/P EST MOD 30 MIN: CPT | Mod: 95 | Performed by: NURSE PRACTITIONER

## 2023-01-30 ASSESSMENT — ENCOUNTER SYMPTOMS
CONSTITUTIONAL NEGATIVE: 1
SHORTNESS OF BREATH: 0
WHEEZING: 0
LIGHT-HEADEDNESS: 0
ABDOMINAL PAIN: 0
SLEEP DISTURBANCE: 1
NERVOUS/ANXIOUS: 1
BLOOD IN STOOL: 0
EYES NEGATIVE: 1
MUSCULOSKELETAL NEGATIVE: 1
COUGH: 0
HEADACHES: 0
PALPITATIONS: 0
DECREASED CONCENTRATION: 1
ENDOCRINE NEGATIVE: 1
DIZZINESS: 0
HALLUCINATIONS: 0

## 2023-01-30 NOTE — PROGRESS NOTES
"Verification of Patient Location:  The patient affirms they are currently located in the following state: [place]    Request for Consent:    Audio and Video Encounter   Hello, my name is KELSY Klein.  Before we proceed, can you please verify your identification by telling me your full name and date of birth?  Can you tell me who is in the room with you?    You and I are about to have a telemedicine check-in or visit because you have requested it.  This is a live video-conference.  I am a real person, speaking to you in real time.  There is no one else with me on the video-conference. I am not recording this conversation and I am asking you not to record it.  This telemedicine visit will be billed to your health insurance or you, if you are self-insured.  You understand you will be responsible for any copayments or coinsurances that apply to your telemedicine visit.  Communication platform used for this encounter:  [employer] Video Visit (Epic Video Client)       Before starting our telemedicine visit, I am required to get your consent for this virtual check-in or visit by telemedicine. Do you consent?      Patient Response to Request for Consent:  Yes      Visit Documentation:  Subjective     Patient ID: Ender Arceo is a 32 y.o. male.  1990           Patient is an 32 y.o. male who presents on 1/30/2023 as follow up . Visit with patient and his mother   Patient did follow up with neurologist 1/27/23.   Plan is to get MRI of the brain to rule out the possibility of an underlying superimposed organic neurologic pathology.  Given the demographics and family history will obtain MRI of the brain query multiple sclerosis.   Patient's mother states that \"it may take a few weeks to get preauth\" from neurologists office. Cannot schedule testing until this is obtained.   Currently, patient c/p \" spikes of anxiety intermittently\"   Counseling once per week.   No medication for anxiety at this time. "   Remains on short term disability. Panic attacks at the thought of returning to work as per patient mother.   We did have some connection issues during visit . Able to see each other clearly but had technical difficulty hearing each other. This seemed to increase patient's anxiety therefore we transitioned to televisit.   Patient states his appetite is good.   Sleeping 2-6 hours per night as per patient       Patient Care Team:  Denisse Coreas CRNP as PCP - General (Internal Medicine)      DTaP, Tdap, and Td Vaccines(7 - Td or Tdap) due on 06/17/2018  Depression Screening due on 12/22/2023  HIB Vaccines Completed  Hepatitis B Vaccines Completed  IPV Vaccines Completed    MEDICATIONS  No current outpatient medications on file.    ALLERGIES  Benzoyl peroxide    Social History    Tobacco Use      Smoking status: Never      Smokeless tobacco: Never    Vaping Use      Vaping Use: Never used    Alcohol use: Not Currently    Drug use: Never    PHYSICAL EXAMINATION  There were no vitals taken for this visit.   There is no height or weight on file to calculate BMI.  Wt Readings from Last 3 Encounters:  01/27/23 : 70.8 kg (156 lb)  12/22/22 : 71.7 kg (158 lb)  10/18/22 : 70.3 kg (155 lb)                 The following have been reviewed and updated as appropriate in this visit:        Review of Systems   Constitutional: Negative.    HENT: Negative.    Eyes: Negative.    Respiratory: Negative for cough, shortness of breath and wheezing.    Cardiovascular: Negative for chest pain and palpitations.   Gastrointestinal: Negative for abdominal pain and blood in stool.   Endocrine: Negative.    Genitourinary: Negative.    Musculoskeletal: Negative.    Skin: Negative.    Neurological: Negative for dizziness, light-headedness and headaches.   Psychiatric/Behavioral: Positive for decreased concentration and sleep disturbance. Negative for hallucinations, self-injury and suicidal ideas. The patient is nervous/anxious.           Assessment/Plan   Diagnoses and all orders for this visit:    Generalized anxiety disorder (Primary)  Assessment & Plan:  Will extend leave to 3/3/23. Form completed for FMLA.         Moderately severe depression  Assessment & Plan:  Patient continues to receive counseling weekly. Mother is trying to arrange psychologist appt for patient also. Does not want to pursue psychiatrist because she feels they will only want to prescribe medication and she and patient do not want this.       Fatigue, unspecified type  -     CBC and differential; Future  -     Comprehensive metabolic panel; Future  -     TSH w reflex FT4; Future    Chronic pain of multiple sites  -     FRANCY Screen (Automated); Future  -     C-reactive protein; Future  -     Rheumatoid factor; Future    Neurodevelopmental disorder    ADHD, predominantly inattentive type    Diabetes mellitus screening  -     Hemoglobin A1c; Future    Lipid screening  -     Lipid panel; Future      Time Spent:  I spent 15 minutes on this date of service performing the following activities: entering orders, documenting, obtaining / reviewing records and providing counseling and education.

## 2023-01-31 ENCOUNTER — TELEPHONE (OUTPATIENT)
Dept: PRIMARY CARE | Facility: CLINIC | Age: 33
End: 2023-01-31
Payer: COMMERCIAL

## 2023-01-31 NOTE — ASSESSMENT & PLAN NOTE
Patient continues to receive counseling weekly. Mother is trying to arrange psychologist appt for patient also. Does not want to pursue psychiatrist because she feels they will only want to prescribe medication and she and patient do not want this.

## 2023-02-10 ENCOUNTER — DOCUMENTATION (OUTPATIENT)
Dept: NEUROLOGY | Facility: CLINIC | Age: 33
End: 2023-02-10
Payer: COMMERCIAL

## 2023-02-10 NOTE — PROGRESS NOTES
MRI Brain W WO Approved via [employer]     Auth# [id number]  Valid through 8/9/23    Spoke with pt and forwarded all information needed for scheduling

## 2023-02-13 LAB
BASOPHILS # BLD AUTO: 0.1 X10E3/UL (ref 0–0.2)
BASOPHILS NFR BLD AUTO: 2 %
EOSINOPHIL # BLD AUTO: 0.2 X10E3/UL (ref 0–0.4)
EOSINOPHIL NFR BLD AUTO: 3 %
ERYTHROCYTE [DISTWIDTH] IN BLOOD BY AUTOMATED COUNT: 12.2 % (ref 11.6–15.4)
HBA1C MFR BLD: 5.2 % (ref 4.8–5.6)
HCT VFR BLD AUTO: 41.7 % (ref 37.5–51)
HGB BLD-MCNC: 14.6 G/DL (ref 13–17.7)
IMM GRANULOCYTES # BLD AUTO: 0 X10E3/UL (ref 0–0.1)
IMM GRANULOCYTES NFR BLD AUTO: 0 %
LYMPHOCYTES # BLD AUTO: 2 X10E3/UL (ref 0.7–3.1)
LYMPHOCYTES NFR BLD AUTO: 35 %
MCH RBC QN AUTO: 29.3 PG (ref 26.6–33)
MCHC RBC AUTO-ENTMCNC: 35 G/DL (ref 31.5–35.7)
MCV RBC AUTO: 84 FL (ref 79–97)
MONOCYTES # BLD AUTO: 0.6 X10E3/UL (ref 0.1–0.9)
MONOCYTES NFR BLD AUTO: 11 %
NEUTROPHILS # BLD AUTO: 2.9 X10E3/UL (ref 1.4–7)
NEUTROPHILS NFR BLD AUTO: 49 %
PLATELET # BLD AUTO: 290 X10E3/UL (ref 150–450)
RBC # BLD AUTO: 4.99 X10E6/UL (ref 4.14–5.8)
WBC # BLD AUTO: 5.8 X10E3/UL (ref 3.4–10.8)

## 2023-02-14 LAB
ALBUMIN SERPL-MCNC: 4.8 G/DL (ref 4–5)
ALBUMIN/GLOB SERPL: 2.2 {RATIO} (ref 1.2–2.2)
ALP SERPL-CCNC: 82 IU/L (ref 44–121)
ALT SERPL-CCNC: 24 IU/L (ref 0–44)
ANA SER QL IF: NEGATIVE
AST SERPL-CCNC: 24 IU/L (ref 0–40)
BILIRUB SERPL-MCNC: 1 MG/DL (ref 0–1.2)
BUN SERPL-MCNC: 13 MG/DL (ref 6–20)
BUN/CREAT SERPL: 10 (ref 9–20)
CALCIUM SERPL-MCNC: 9.8 MG/DL (ref 8.7–10.2)
CHLORIDE SERPL-SCNC: 102 MMOL/L (ref 96–106)
CHOLEST SERPL-MCNC: 171 MG/DL (ref 100–199)
CO2 SERPL-SCNC: 26 MMOL/L (ref 20–29)
CREAT SERPL-MCNC: 1.32 MG/DL (ref 0.76–1.27)
CRP SERPL-MCNC: <1 MG/L (ref 0–10)
EGFRCR SERPLBLD CKD-EPI 2021: 73 ML/MIN/1.73
GLOBULIN SER CALC-MCNC: 2.2 G/DL (ref 1.5–4.5)
GLUCOSE SERPL-MCNC: 88 MG/DL (ref 70–99)
HDLC SERPL-MCNC: 49 MG/DL
LABORATORY COMMENT REPORT: NORMAL
LDLC SERPL CALC-MCNC: 108 MG/DL (ref 0–99)
POTASSIUM SERPL-SCNC: 4.1 MMOL/L (ref 3.5–5.2)
PROT SERPL-MCNC: 7 G/DL (ref 6–8.5)
RHEUMATOID FACT SERPL-ACNC: <10 IU/ML
SODIUM SERPL-SCNC: 141 MMOL/L (ref 134–144)
T4 FREE SERPL-MCNC: 1.52 NG/DL (ref 0.82–1.77)
TRIGL SERPL-MCNC: 72 MG/DL (ref 0–149)
TSH SERPL DL<=0.005 MIU/L-ACNC: 2.29 UIU/ML (ref 0.45–4.5)
VLDLC SERPL CALC-MCNC: 14 MG/DL (ref 5–40)

## 2023-02-19 DIAGNOSIS — R79.89 ELEVATED SERUM CREATININE: Primary | ICD-10-CM

## 2023-02-24 ENCOUNTER — HOSPITAL ENCOUNTER (OUTPATIENT)
Dept: RADIOLOGY | Facility: HOSPITAL | Age: 33
Discharge: HOME | End: 2023-02-24
Attending: STUDENT IN AN ORGANIZED HEALTH CARE EDUCATION/TRAINING PROGRAM
Payer: COMMERCIAL

## 2023-02-24 DIAGNOSIS — G37.9 DEMYELINATING DISEASE (CMS/HCC): ICD-10-CM

## 2023-02-24 PROCEDURE — 70553 MRI BRAIN STEM W/O & W/DYE: CPT | Mod: MG

## 2023-02-24 PROCEDURE — A9585 GADOBUTROL INJECTION: HCPCS | Performed by: STUDENT IN AN ORGANIZED HEALTH CARE EDUCATION/TRAINING PROGRAM

## 2023-02-24 RX ORDER — GADOBUTROL 604.72 MG/ML
0.1 INJECTION INTRAVENOUS ONCE
Status: COMPLETED | OUTPATIENT
Start: 2023-02-24 | End: 2023-02-24

## 2023-02-24 RX ADMIN — GADOBUTROL 7.5 MMOL: 604.72 INJECTION INTRAVENOUS at 09:30

## 2023-02-27 ENCOUNTER — TELEMEDICINE (OUTPATIENT)
Dept: PRIMARY CARE | Facility: CLINIC | Age: 33
End: 2023-02-27
Payer: COMMERCIAL

## 2023-02-27 ENCOUNTER — TELEPHONE (OUTPATIENT)
Dept: NEUROLOGY | Facility: CLINIC | Age: 33
End: 2023-02-27
Payer: COMMERCIAL

## 2023-02-27 DIAGNOSIS — F90.0 ADHD, PREDOMINANTLY INATTENTIVE TYPE: ICD-10-CM

## 2023-02-27 DIAGNOSIS — D35.2 PITUITARY MICROADENOMA (CMS/HCC): Primary | ICD-10-CM

## 2023-02-27 DIAGNOSIS — F32.A MODERATELY SEVERE DEPRESSION: ICD-10-CM

## 2023-02-27 DIAGNOSIS — F89 NEURODEVELOPMENTAL DISORDER: ICD-10-CM

## 2023-02-27 DIAGNOSIS — R79.89 ELEVATED SERUM CREATININE: ICD-10-CM

## 2023-02-27 DIAGNOSIS — F41.1 GENERALIZED ANXIETY DISORDER: Primary | ICD-10-CM

## 2023-02-27 DIAGNOSIS — E23.7 PITUITARY LESION (CMS/HCC): ICD-10-CM

## 2023-02-27 PROCEDURE — 99214 OFFICE O/P EST MOD 30 MIN: CPT | Mod: 95 | Performed by: NURSE PRACTITIONER

## 2023-02-27 NOTE — RESULT ENCOUNTER NOTE
Telephone call to discuss results with patient.      Discussed with patient and mother.     Will obtain repeat MRI pituitary protocol in 6 months per radiology recommendations.     Follow up  in 6 months.

## 2023-03-07 LAB
ALBUMIN/CREAT UR: 3 MG/G CREAT (ref 0–29)
BUN SERPL-MCNC: 12 MG/DL (ref 6–20)
BUN/CREAT SERPL: 10 (ref 9–20)
CALCIUM SERPL-MCNC: 9.7 MG/DL (ref 8.7–10.2)
CHLORIDE SERPL-SCNC: 100 MMOL/L (ref 96–106)
CO2 SERPL-SCNC: 29 MMOL/L (ref 20–29)
CREAT SERPL-MCNC: 1.25 MG/DL (ref 0.76–1.27)
CREAT UR-MCNC: 229.6 MG/DL
EGFRCR SERPLBLD CKD-EPI 2021: 78 ML/MIN/1.73
GLUCOSE SERPL-MCNC: 91 MG/DL (ref 70–99)
MICROALBUMIN UR-MCNC: 6.6 UG/ML
POTASSIUM SERPL-SCNC: 4.2 MMOL/L (ref 3.5–5.2)
PROLACTIN SERPL-MCNC: 13.8 NG/ML (ref 4–15.2)
SODIUM SERPL-SCNC: 140 MMOL/L (ref 134–144)

## 2023-03-28 ENCOUNTER — TELEMEDICINE (OUTPATIENT)
Dept: PRIMARY CARE | Facility: CLINIC | Age: 33
End: 2023-03-28
Payer: COMMERCIAL

## 2023-03-28 DIAGNOSIS — F41.1 GENERALIZED ANXIETY DISORDER: Primary | ICD-10-CM

## 2023-03-28 DIAGNOSIS — F90.0 ADHD, PREDOMINANTLY INATTENTIVE TYPE: ICD-10-CM

## 2023-03-28 DIAGNOSIS — F32.A MODERATELY SEVERE DEPRESSION: ICD-10-CM

## 2023-03-28 DIAGNOSIS — E23.7 PITUITARY LESION (CMS/HCC): ICD-10-CM

## 2023-03-28 DIAGNOSIS — F89 NEURODEVELOPMENTAL DISORDER: ICD-10-CM

## 2023-03-28 PROCEDURE — 99213 OFFICE O/P EST LOW 20 MIN: CPT | Mod: 95 | Performed by: NURSE PRACTITIONER

## 2023-03-28 ASSESSMENT — ENCOUNTER SYMPTOMS
DIFFICULTY URINATING: 0
BLOOD IN STOOL: 0
HEADACHES: 0
WHEEZING: 0
EYES NEGATIVE: 1
SHORTNESS OF BREATH: 0
ENDOCRINE NEGATIVE: 1
LIGHT-HEADEDNESS: 0
HEMATURIA: 0
NERVOUS/ANXIOUS: 1
MUSCULOSKELETAL NEGATIVE: 1
DIZZINESS: 1
COUGH: 0
ABDOMINAL PAIN: 0
CONSTITUTIONAL NEGATIVE: 1

## 2023-03-28 NOTE — PROGRESS NOTES
Verification of Patient Location:  The patient affirms they are currently located in the following state: [place]    Request for Consent:    Audio and Video Encounter   Hello, my name is KELSY Klein.  Before we proceed, can you please verify your identification by telling me your full name and date of birth?  Can you tell me who is in the room with you?    You and I are about to have a telemedicine check-in or visit because you have requested it.  This is a live video-conference.  I am a real person, speaking to you in real time.  There is no one else with me on the video-conference. I am not recording this conversation and I am asking you not to record it.  This telemedicine visit will be billed to your health insurance or you, if you are self-insured.  You understand you will be responsible for any copayments or coinsurances that apply to your telemedicine visit.  Communication platform used for this encounter:  [employer] Video Visit (Epic Video Client)       Before starting our telemedicine visit, I am required to get your consent for this virtual check-in or visit by telemedicine. Do you consent?      Patient Response to Request for Consent:  Yes      Visit Documentation:  Subjective     Patient ID: Ender Arceo is a 32 y.o. male.  1990          HISTORY OF PRESENT ILLNESS     Patient is an 32 y.o. male who presents on 3/28/2023 as follow up   Accompanied by mother for video visit  Patient states week has been busy and he has had a few panic attacks.   Accompanied by his  usual dizzy spells.   Scheduled to see neurosurgeon at [facility] re: MRI results. Appt April 12th.   Neuropsych eval is planned for June   Therapist every Friday .  DORA score 11 today which indicated moderate depression.      Patient Care Team:  Denisse Coreas CRNP as PCP - General (Internal Medicine)      DTaP, Tdap, and Td Vaccines(7 - Td or Tdap) due on 06/17/2018  Depression Screening due on 12/22/2023  Zoster  Vaccine(1 of 2) due on 09/10/2040  HIB Vaccines Completed  Hepatitis B Vaccines Completed  IPV Vaccines Completed    PAST MEDICAL AND SURGICAL HISTORY  Past Medical History:  No date: ADHD (attention deficit hyperactivity disorder), inattentive   type  No date: Generalized anxiety disorder with panic attacks  No date: Moderately severe major depression (CMS/HCC)  No date: Neurodevelopmental disorder    No past surgical history on file.    MEDICATIONS  No current outpatient medications on file.    ALLERGIES  Benzoyl peroxide    FAMILY HISTORY  Review of patient's family history indicates:  Problem: Multiple sclerosis      Relation: Biological Mother          Age of Onset: (Not Specified)  Problem: Anxiety disorder      Relation: Biological Brother          Age of Onset: (Not Specified)      SOCIAL/ TOBACCO HISTORY  Social History    Tobacco Use      Smoking status: Never      Smokeless tobacco: Never    Vaping Use      Vaping status: Never Used    Alcohol use: Not Currently    Drug use: Never    There were no vitals taken for this visit.   There is no height or weight on file to calculate BMI.  Wt Readings from Last 3 Encounters:  02/24/23 : 74.8 kg (165 lb)  01/27/23 : 70.8 kg (156 lb)  12/22/22 : 71.7 kg (158 lb)     Lab Results       Component                Value               Date                       HGBA1C                   5.2                 02/13/2023            Lab Results       Component                Value               Date                       CHOL                     171                 02/13/2023            Lab Results       Component                Value               Date                       HDL                      49                  02/13/2023            Lab Results       Component                Value               Date                       LDLCALC                  108 (H)             02/13/2023            Lab Results       Component                Value               Date                        TRIG                     72                  02/13/2023            No results found for: CHOLHDL  Lab Results       Component                Value               Date                       TSH                      2.290               02/13/2023            Lab Results       Component                Value               Date                       WBC                      5.8                 02/13/2023                 HGB                      14.6                02/13/2023                 HCT                      41.7                02/13/2023                 MCV                      84                  02/13/2023                 PLT                      290                 02/13/2023            Lab Results       Component                Value               Date                       NA                       140                 03/06/2023                 K                        4.2                 03/06/2023                 CL                       100                 03/06/2023                 CO2                      29                  03/06/2023                 BUN                      12                  03/06/2023                 CREATININE               1.25                03/06/2023                 GLUCOSE                  91                  03/06/2023                 AST                      24                  02/13/2023                 ALT                      24                  02/13/2023                 PROTEIN                  7.0                 02/13/2023                 ALBUMIN                  4.8                 02/13/2023                 BILITOT                  1.0                 02/13/2023                 EGFR                     78                  03/06/2023                     The following have been reviewed and updated as appropriate in this visit:        Review of Systems   Constitutional: Negative.    HENT: Negative.    Eyes: Negative.    Respiratory: Negative for cough, shortness  of breath and wheezing.    Cardiovascular: Negative for chest pain.   Gastrointestinal: Negative for abdominal pain and blood in stool.   Endocrine: Negative.    Genitourinary: Negative for difficulty urinating and hematuria.   Musculoskeletal: Negative.    Skin: Negative.    Neurological: Positive for dizziness. Negative for light-headedness and headaches.        Dizziness with panic attacks. 2 episodes recently.    Psychiatric/Behavioral: The patient is nervous/anxious.          Assessment/Plan   Diagnoses and all orders for this visit:    Generalized anxiety disorder (Primary)  Assessment & Plan:  DORA score today remains 11.  Patient with 2 recent panic attacks.  Patient not good with change.  Family still does not want patient to be on medication.      Moderately severe depression  Assessment & Plan:  Patient continues to see therapist every Friday.  Patient is using coping techniques for panic attacks.  Patient feels that depression is not as severe as his anxiety at this time.  Will extend leave through 4/13/2023.  I do not believe patient will be capable of returning to work and they need to begin the process for long-term disability and SSI.  Patient's mother verbalized understanding.      Neurodevelopmental disorder  Assessment & Plan:  Patient had his own personal baseline of function.  Patient unable to coordinate appointments or fill out paperwork.  Mother assists him with all of these tasks.      ADHD, predominantly inattentive type  Assessment & Plan:  Patient does have neuropsych eval scheduled for June.  Unable to get sooner appointment.      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  Surgeons name is Dr. Og Rice.         Time Spent:  I spent 20 minutes on this date of service performing the following activities: entering orders, documenting, obtaining /  "reviewing records and providing counseling and education.      3/31/23 received message from patient's mother stating:  The recent [facility] paperwork that you filled out needs a date change on the return to work date.  My employer, [employer], is extending my leave out to a return to work on 10/13/23. [facility] needs you to change that return to work date on the [facility] paperwork you filled out on 4/13/23 to read \"10/13/23\" instead, and sign your name and date it next to the correction.   Unable to print out the paperwork that I already completed.  Recompleted paperwork.  Will have staff fax.    "

## 2023-03-28 NOTE — ASSESSMENT & PLAN NOTE
Patient had his own personal baseline of function.  Patient unable to coordinate appointments or fill out paperwork.  Mother assists him with all of these tasks.

## 2023-03-28 NOTE — ASSESSMENT & PLAN NOTE
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  Surgeons name is Dr. Og Rice.

## 2023-03-28 NOTE — ASSESSMENT & PLAN NOTE
DORA score today remains 11.  Patient with 2 recent panic attacks.  Patient not good with change.  Family still does not want patient to be on medication.

## 2023-03-28 NOTE — ASSESSMENT & PLAN NOTE
Patient continues to see therapist every Friday.  Patient is using coping techniques for panic attacks.  Patient feels that depression is not as severe as his anxiety at this time.  Will extend leave through 4/13/2023.  I do not believe patient will be capable of returning to work and they need to begin the process for long-term disability and SSI.  Patient's mother verbalized understanding.

## 2023-04-14 ENCOUNTER — TELEPHONE (OUTPATIENT)
Dept: PRIMARY CARE | Facility: CLINIC | Age: 33
End: 2023-04-14
Payer: COMMERCIAL

## 2023-04-14 NOTE — TELEPHONE ENCOUNTER
Ender's mother called stating pt had a visit this week with the neurosurgeon and wanted to upload the visit on the [employer]. I explained I personally didn't know how to upload anything on the portal but directed her to technical support with [employer].    She also stated he doesn't have any appts until June with neuropsych    She wants to know when Denisse would like him to be seen again by her in office?

## 2023-04-14 NOTE — TELEPHONE ENCOUNTER
Patient has been scheduled for august.    Katie was also able to upload Dominics visit on the portal. Not sure if that gets sent to you or not but its in media for review

## 2023-05-03 ENCOUNTER — TELEPHONE (OUTPATIENT)
Dept: PRIMARY CARE | Facility: CLINIC | Age: 33
End: 2023-05-03
Payer: COMMERCIAL

## 2023-05-03 NOTE — TELEPHONE ENCOUNTER
Katie shanks mom called asking for records to be sent to [id number]    Fax number [phone]  Claim number [id number]

## 2023-08-14 ENCOUNTER — OFFICE VISIT (OUTPATIENT)
Dept: PRIMARY CARE | Facility: CLINIC | Age: 33
End: 2023-08-14
Payer: COMMERCIAL

## 2023-08-14 VITALS
TEMPERATURE: 97.9 F | SYSTOLIC BLOOD PRESSURE: 110 MMHG | BODY MASS INDEX: 24.14 KG/M2 | WEIGHT: 163 LBS | OXYGEN SATURATION: 99 % | HEART RATE: 61 BPM | DIASTOLIC BLOOD PRESSURE: 66 MMHG | HEIGHT: 69 IN

## 2023-08-14 DIAGNOSIS — F32.A MODERATELY SEVERE DEPRESSION: ICD-10-CM

## 2023-08-14 DIAGNOSIS — F89 NEURODEVELOPMENTAL DISORDER: ICD-10-CM

## 2023-08-14 DIAGNOSIS — E23.7 PITUITARY LESION (CMS/HCC): ICD-10-CM

## 2023-08-14 DIAGNOSIS — F41.1 GENERALIZED ANXIETY DISORDER: Primary | ICD-10-CM

## 2023-08-14 DIAGNOSIS — R79.89 ELEVATED SERUM CREATININE: ICD-10-CM

## 2023-08-14 PROCEDURE — 3008F BODY MASS INDEX DOCD: CPT | Performed by: NURSE PRACTITIONER

## 2023-08-14 PROCEDURE — 99214 OFFICE O/P EST MOD 30 MIN: CPT | Performed by: NURSE PRACTITIONER

## 2023-08-14 ASSESSMENT — ENCOUNTER SYMPTOMS
DIZZINESS: 0
CHEST TIGHTNESS: 0
PALPITATIONS: 0
LIGHT-HEADEDNESS: 0
SPEECH DIFFICULTY: 0
WEAKNESS: 0
DIFFICULTY URINATING: 0
ENDOCRINE NEGATIVE: 1
FEVER: 0
DYSURIA: 0
WHEEZING: 0
NERVOUS/ANXIOUS: 0
ABDOMINAL PAIN: 0
CHILLS: 0
COUGH: 0
SLEEP DISTURBANCE: 0
SHORTNESS OF BREATH: 0
EYES NEGATIVE: 1
HEADACHES: 0
MUSCULOSKELETAL NEGATIVE: 1
BLOOD IN STOOL: 0
HEMATURIA: 0
DIAPHORESIS: 0

## 2023-08-14 ASSESSMENT — PATIENT HEALTH QUESTIONNAIRE - PHQ9: SUM OF ALL RESPONSES TO PHQ9 QUESTIONS 1 & 2: 1

## 2023-08-14 NOTE — ASSESSMENT & PLAN NOTE
Patient depression score today 1.  Patient not approved for long-term disability.  He states he will be looking for a job once he make sure he does not need to do anything further for his mother.

## 2023-08-14 NOTE — ASSESSMENT & PLAN NOTE
Patient's DORA score has decreased from 11 to a value of 6.  Patient feels that therapy weekly helped significantly.  Patient denies any recent anxiety attacks.  Patient's mother recently diagnosed with pancreatic cancer and he has been poking seeing on her rather than himself.

## 2023-08-14 NOTE — PROGRESS NOTES
Subjective      Patient ID: Ender Arceo is a 32 y.o. male.  1990          HISTORY OF PRESENT ILLNESS      Follow-up (6 month)  Patient is an 32 y.o. male who presents on 8/14/2023   Patient very calm and relaxed today.  He states he was attending therapy weekly but this is now decreased due to changes in his insurance.  He was denied long-term disability.  He has been preoccupied with concerns regarding his mother who is newly diagnosed with pancreatic cancer.  She has completed surgery and is scheduled to have a port placed for chemotherapy on Friday.  Patient has been trying to step up and do more for himself and rely less on his mother.  He has been busy going to the hospital to see her and to the nursing home to visit her.  He states he has been tired and sleeping well at night because he has been so busy.    Patient Care Team:  Denisse Coreas CRNP as PCP - General (Internal Medicine)      DTaP, Tdap, and Td Vaccines(7 - Td or Tdap) due on 06/17/2018  Depression Screening due on 12/22/2023  Zoster Vaccine(1 of 2) due on 09/10/2040  HIB Vaccines Completed  Hepatitis B Vaccines Completed  IPV Vaccines Completed    PAST MEDICAL AND SURGICAL HISTORY  Past Medical History:  No date: ADHD (attention deficit hyperactivity disorder), inattentive   type  No date: Generalized anxiety disorder with panic attacks  No date: Moderately severe major depression (CMS/HCC)  No date: Neurodevelopmental disorder    History reviewed. No pertinent surgical history.    MEDICATIONS  No current outpatient medications on file.    ALLERGIES  Benzoyl peroxide    FAMILY HISTORY  Review of patient's family history indicates:  Problem: Multiple sclerosis      Relation: Biological Mother          Age of Onset: (Not Specified)  Problem: Anxiety disorder      Relation: Biological Brother          Age of Onset: (Not Specified)    SOCIAL/ TOBACCO HISTORY  Social History    Tobacco Use      Smoking status: Never      Smokeless  "tobacco: Never    Vaping Use      Vaping Use: Never used    Alcohol use: Not Currently    Drug use: Never    /66 (BP Location: Left upper arm, Patient Position: Sitting)   Pulse 61   Temp 36.6 °C (97.9 °F) (Oral)   Ht 1.753 m (5' 9\")   Wt 73.9 kg (163 lb)   SpO2 99%   BMI 24.07 kg/m²    Body mass index is 24.07 kg/m².  Wt Readings from Last 3 Encounters:  08/14/23 : 73.9 kg (163 lb)  02/24/23 : 74.8 kg (165 lb)  01/27/23 : 70.8 kg (156 lb)       Lab Results       Component                Value               Date                       HGBA1C                   5.2                 02/13/2023            Lab Results       Component                Value               Date                       CHOL                     171                 02/13/2023            Lab Results       Component                Value               Date                       HDL                      49                  02/13/2023            Lab Results       Component                Value               Date                       LDLCALC                  108 (H)             02/13/2023            Lab Results       Component                Value               Date                       TRIG                     72                  02/13/2023            Lab Results       Component                Value               Date                       TSH                      2.290               02/13/2023            Lab Results       Component                Value               Date                       WBC                      5.8                 02/13/2023                 HGB                      14.6                02/13/2023                 HCT                      41.7                02/13/2023                 MCV                      84                  02/13/2023                 PLT                      290                 02/13/2023            Lab Results       Component                Value               Date                       NA         "               140                 03/06/2023                 K                        4.2                 03/06/2023                 CL                       100                 03/06/2023                 CO2                      29                  03/06/2023                 BUN                      12                  03/06/2023                 CREATININE               1.25                03/06/2023                 GLUCOSE                  91                  03/06/2023                 AST                      24                  02/13/2023                 ALT                      24                  02/13/2023                 PROTEIN                  7.0                 02/13/2023                 ALBUMIN                  4.8                 02/13/2023                 BILITOT                  1.0                 02/13/2023                 EGFR                     78                  03/06/2023               Denisse Coreas, KELSY  8/14/2023          The following have been reviewed and updated as appropriate in this visit:        Review of Systems   Constitutional: Negative for chills, diaphoresis and fever.   HENT: Negative.    Eyes: Negative.    Respiratory: Negative for cough, chest tightness, shortness of breath and wheezing.    Cardiovascular: Negative for chest pain, palpitations and leg swelling.   Gastrointestinal: Negative for abdominal pain and blood in stool.   Endocrine: Negative.    Genitourinary: Negative for difficulty urinating, dysuria and hematuria.   Musculoskeletal: Negative.    Skin: Negative.    Neurological: Negative for dizziness, speech difficulty, weakness, light-headedness and headaches.   Psychiatric/Behavioral: Negative for self-injury, sleep disturbance and suicidal ideas. The patient is not nervous/anxious.        Objective     Vitals:    08/14/23 1039   BP: 110/66   BP Location: Left upper arm   Patient Position: Sitting   Pulse: 61   Temp: 36.6 °C (97.9 °F)   TempSrc: Oral  "  SpO2: 99%   Weight: 73.9 kg (163 lb)   Height: 1.753 m (5' 9\")     Body mass index is 24.07 kg/m².    Physical Exam  Vitals reviewed.   Constitutional:       Appearance: Normal appearance.   HENT:      Head: Normocephalic and atraumatic.   Eyes:      Conjunctiva/sclera: Conjunctivae normal.   Cardiovascular:      Rate and Rhythm: Normal rate and regular rhythm.      Pulses: Normal pulses.      Heart sounds: Normal heart sounds.   Pulmonary:      Effort: Pulmonary effort is normal.      Breath sounds: Normal breath sounds. No wheezing or rales.   Abdominal:      General: Bowel sounds are normal. There is no distension.      Palpations: Abdomen is soft.      Tenderness: There is no abdominal tenderness. There is no guarding.   Musculoskeletal:         General: Normal range of motion.      Right lower leg: No edema.      Left lower leg: No edema.   Skin:     General: Skin is warm and dry.      Capillary Refill: Capillary refill takes less than 2 seconds.   Neurological:      Mental Status: He is alert and oriented to person, place, and time.   Psychiatric:         Attention and Perception: Attention normal.         Mood and Affect: Affect normal.         Behavior: Behavior is cooperative.         Assessment/Plan   Diagnoses and all orders for this visit:    Generalized anxiety disorder (Primary)  Assessment & Plan:  Patient's DORA score has decreased from 11 to a value of 6.  Patient feels that therapy weekly helped significantly.  Patient denies any recent anxiety attacks.  Patient's mother recently diagnosed with pancreatic cancer and he has been poking seeing on her rather than himself.       Moderately severe depression  Assessment & Plan:  Patient depression score today 1.  Patient not approved for long-term disability.  He states he will be looking for a job once he make sure he does not need to do anything further for his mother.      Neurodevelopmental disorder  Assessment & Plan:  Patient able to answer all " questions regarding his health without difficulty.  Needs assistance with things like completing paperwork or coordinating services.      Creatinine elevation  -     Comprehensive metabolic panel; Future  -     Microalbumin / creatinine urine ratio; Future    Pituitary lesion (CMS/HCC)  Assessment & Plan:  Surgeon does not feel that pituitary cyst requires any intervention.  Patient will follow-up and have repeat MRI every 6 to 12 months.  Patient states he did have a follow-up MRI in July 2023.      Elevated serum creatinine  Assessment & Plan:  Patient's creatinine in February was 1.32.  Rechecked in March and it was 1.25.  eGFR is good at 78.  We will get updated values today and check every 6 months.

## 2023-08-14 NOTE — ASSESSMENT & PLAN NOTE
Surgeon does not feel that pituitary cyst requires any intervention.  Patient will follow-up and have repeat MRI every 6 to 12 months.  Patient states he did have a follow-up MRI in July 2023.

## 2023-08-14 NOTE — ASSESSMENT & PLAN NOTE
Patient's creatinine in February was 1.32.  Rechecked in March and it was 1.25.  eGFR is good at 78.  We will get updated values today and check every 6 months.

## 2023-08-14 NOTE — ASSESSMENT & PLAN NOTE
Patient able to answer all questions regarding his health without difficulty.  Needs assistance with things like completing paperwork or coordinating services.

## 2024-11-15 ENCOUNTER — OFFICE VISIT (OUTPATIENT)
Dept: INTERNAL MEDICINE | Facility: CLINIC | Age: 34
End: 2024-11-15
Payer: COMMERCIAL

## 2024-11-15 VITALS
SYSTOLIC BLOOD PRESSURE: 108 MMHG | WEIGHT: 176 LBS | DIASTOLIC BLOOD PRESSURE: 60 MMHG | TEMPERATURE: 98.6 F | HEART RATE: 86 BPM | HEIGHT: 69 IN | OXYGEN SATURATION: 99 % | BODY MASS INDEX: 26.07 KG/M2 | RESPIRATION RATE: 17 BRPM

## 2024-11-15 DIAGNOSIS — Z00.00 WELL ADULT EXAM: Primary | ICD-10-CM

## 2024-11-15 DIAGNOSIS — Z13.1 SCREENING FOR DIABETES MELLITUS: ICD-10-CM

## 2024-11-15 DIAGNOSIS — Z13.220 SCREENING FOR HYPERLIPIDEMIA: ICD-10-CM

## 2024-11-15 PROCEDURE — 99385 PREV VISIT NEW AGE 18-39: CPT | Performed by: STUDENT IN AN ORGANIZED HEALTH CARE EDUCATION/TRAINING PROGRAM

## 2024-11-15 PROCEDURE — 3008F BODY MASS INDEX DOCD: CPT | Performed by: STUDENT IN AN ORGANIZED HEALTH CARE EDUCATION/TRAINING PROGRAM

## 2024-11-15 ASSESSMENT — PATIENT HEALTH QUESTIONNAIRE - PHQ9: SUM OF ALL RESPONSES TO PHQ9 QUESTIONS 1 & 2: 0

## 2024-11-15 NOTE — PROGRESS NOTES
Subjective      Patient ID: Ender Arceo is a 34 y.o. male here for the following:   Establish Care (New patient)      HPI    Anxiety  Ongoing stress about mother's pancreatic cancer  Reports he is handling it OK  Offered therapy to patient    Surgical updates:  None    FamHx:  Cancer in multiple family members - leukemia(?), pancreatic, prostate     Social:  EtOH: Rarely  Diet: Well-balanced  Exercise: Often  Smoking: Never      The following have been reviewed and updated as appropriate in this visit:      Allergies  Meds  Problems  Social History      Patient Active Problem List   Diagnosis    Generalized anxiety disorder    Neurodevelopmental disorder    ADHD, predominantly inattentive type    Well adult exam    Moderately severe depression    Chronic pain of multiple sites    Pituitary lesion (CMS/HCC)    Elevated serum creatinine    .    Social History     Tobacco Use    Smoking status: Never    Smokeless tobacco: Never   Vaping Use    Vaping status: Never Used   Substance Use Topics    Alcohol use: Not Currently    Drug use: Never       Allergies as of 11/15/2024 - Reviewed 11/15/2024   Allergen Reaction Noted    Benzoyl peroxide Other (see comments) 01/27/2023       No current outpatient medications on file.      Review of systems as per HPI and below    PHQ-9 Results  Will the patient answer the depression questions?: Y    Little interest or pleasure in doing things: Not at all    Feeling down, depressed, or hopeless: Not at all    Depression Risk: 0    No data recorded  No data recorded  No data recorded  No data recorded  No data recorded  No data recorded  No data recorded  No data recorded  No data recorded  No data recorded    [place] Suicide Severity Rating Scale  No data recorded  No data recorded  No data recorded  No data recorded  No data recorded  No data recorded  No data recorded           Review of Systems  Objective   Vitals:   Vitals:    11/15/24 1010   BP: 108/60   BP Location: Left  "upper arm   Patient Position: Sitting   Pulse: 86   Resp: 17   Temp: 37 °C (98.6 °F)   TempSrc: Oral   SpO2: 99%   Weight: 79.8 kg (176 lb)   Height: 1.753 m (5' 9\")     Body mass index is 25.99 kg/m².    Physical Exam  Constitutional:       General: He is not in acute distress.  HENT:      Head: Normocephalic and atraumatic.      Right Ear: External ear normal.      Left Ear: External ear normal.   Eyes:      General:         Right eye: No discharge.         Left eye: No discharge.   Cardiovascular:      Rate and Rhythm: Normal rate and regular rhythm.      Pulses: Normal pulses.      Heart sounds: Normal heart sounds.   Pulmonary:      Effort: Pulmonary effort is normal.      Breath sounds: Normal breath sounds.   Neurological:      Mental Status: He is alert.         ASSESSMENT & PLAN    Problem List Items Addressed This Visit       Well adult exam - Primary     Well adult exam  Overall doing well  Declines Tdap  Check CMP for prior elevated Cr          Other Visit Diagnoses       Screening for hyperlipidemia        Relevant Orders    Lipid panel    Screening for diabetes mellitus        Relevant Orders    Comprehensive metabolic panel            Orders Placed This Encounter   Procedures    Comprehensive metabolic panel     Standing Status:   Future     Number of Occurrences:   1     Standing Expiration Date:   11/15/2025     Order Specific Question:   Release to patient     Answer:   Immediate [1]    Lipid panel     Standing Status:   Future     Number of Occurrences:   1     Standing Expiration Date:   11/15/2025     Order Specific Question:   Release to patient     Answer:   Immediate [1]           _____________________  Kendrick Sandhu DO  11/15/24      "